# Patient Record
Sex: MALE | Race: WHITE | NOT HISPANIC OR LATINO | Employment: FULL TIME | URBAN - METROPOLITAN AREA
[De-identification: names, ages, dates, MRNs, and addresses within clinical notes are randomized per-mention and may not be internally consistent; named-entity substitution may affect disease eponyms.]

---

## 2017-08-04 DIAGNOSIS — C61 MALIGNANT NEOPLASM OF PROSTATE (HCC): ICD-10-CM

## 2017-08-07 ENCOUNTER — ALLSCRIPTS OFFICE VISIT (OUTPATIENT)
Dept: OTHER | Facility: OTHER | Age: 62
End: 2017-08-07

## 2017-08-07 LAB
BILIRUB UR QL STRIP: NORMAL
CLARITY UR: NORMAL
COLOR UR: YELLOW
GLUCOSE (HISTORICAL): NORMAL
HGB UR QL STRIP.AUTO: NORMAL
KETONES UR STRIP-MCNC: NORMAL MG/DL
LEUKOCYTE ESTERASE UR QL STRIP: NORMAL
NITRITE UR QL STRIP: NORMAL
PH UR STRIP.AUTO: 7 [PH]
PROT UR STRIP-MCNC: NORMAL MG/DL
SP GR UR STRIP.AUTO: 1.01
UROBILINOGEN UR QL STRIP.AUTO: 0.2

## 2018-01-14 VITALS
DIASTOLIC BLOOD PRESSURE: 72 MMHG | SYSTOLIC BLOOD PRESSURE: 134 MMHG | BODY MASS INDEX: 26.84 KG/M2 | WEIGHT: 167 LBS | HEIGHT: 66 IN

## 2018-08-07 DIAGNOSIS — C61 MALIGNANT NEOPLASM OF PROSTATE (HCC): ICD-10-CM

## 2018-08-17 RX ORDER — VARDENAFIL HYDROCHLORIDE 20 MG/1
TABLET ORAL
COMMUNITY
End: 2018-08-24 | Stop reason: SDUPTHER

## 2018-08-17 RX ORDER — SIMVASTATIN 40 MG
TABLET ORAL
COMMUNITY
Start: 2018-04-09

## 2018-08-17 RX ORDER — CHLORAL HYDRATE 500 MG
CAPSULE ORAL
COMMUNITY

## 2018-08-17 RX ORDER — ERGOCALCIFEROL (VITAMIN D2) 10 MCG
TABLET ORAL
COMMUNITY

## 2018-08-24 ENCOUNTER — TELEPHONE (OUTPATIENT)
Dept: UROLOGY | Facility: AMBULATORY SURGERY CENTER | Age: 63
End: 2018-08-24

## 2018-08-24 ENCOUNTER — OFFICE VISIT (OUTPATIENT)
Dept: UROLOGY | Facility: MEDICAL CENTER | Age: 63
End: 2018-08-24
Payer: COMMERCIAL

## 2018-08-24 VITALS
BODY MASS INDEX: 26.84 KG/M2 | WEIGHT: 167 LBS | DIASTOLIC BLOOD PRESSURE: 64 MMHG | HEIGHT: 66 IN | SYSTOLIC BLOOD PRESSURE: 140 MMHG

## 2018-08-24 DIAGNOSIS — N52.31 ERECTILE DYSFUNCTION AFTER RADICAL PROSTATECTOMY: ICD-10-CM

## 2018-08-24 DIAGNOSIS — C61 MALIGNANT NEOPLASM OF PROSTATE (HCC): Primary | ICD-10-CM

## 2018-08-24 PROBLEM — M17.0 PRIMARY OSTEOARTHRITIS OF BOTH KNEES: Status: ACTIVE | Noted: 2018-05-09

## 2018-08-24 LAB
SL AMB  POCT GLUCOSE, UA: NORMAL
SL AMB LEUKOCYTE ESTERASE,UA: NORMAL
SL AMB POCT BILIRUBIN,UA: NORMAL
SL AMB POCT BLOOD,UA: NORMAL
SL AMB POCT CLARITY,UA: CLEAR
SL AMB POCT COLOR,UA: YELLOW
SL AMB POCT KETONES,UA: NORMAL
SL AMB POCT NITRITE,UA: NORMAL
SL AMB POCT PH,UA: 6
SL AMB POCT SPECIFIC GRAVITY,UA: 1.01
SL AMB POCT URINE PROTEIN: NORMAL
SL AMB POCT UROBILINOGEN: 0.2

## 2018-08-24 PROCEDURE — 99214 OFFICE O/P EST MOD 30 MIN: CPT | Performed by: UROLOGY

## 2018-08-24 PROCEDURE — 81003 URINALYSIS AUTO W/O SCOPE: CPT | Performed by: UROLOGY

## 2018-08-24 RX ORDER — VARDENAFIL HYDROCHLORIDE 20 MG/1
20 TABLET ORAL DAILY PRN
Qty: 10 TABLET | Refills: 3 | Status: SHIPPED | OUTPATIENT
Start: 2018-08-24

## 2018-08-24 NOTE — ASSESSMENT & PLAN NOTE
He has not yet tried Levitra  His prescription was refilled  We discussed other options: SADI, injection therapy and penile prosthesis

## 2018-08-24 NOTE — PATIENT INSTRUCTIONS
Prostate Cancer, Ambulatory Care   GENERAL INFORMATION:   Prostate cancer, Ambulatory Care develops in the male sex gland that helps make semen (prostate)  It is about the size of a walnut and wraps around the urethra  The urethra is the tube that carries urine from the bladder to the end of the penis  In most cases, prostate cancer is slow growing  Common symptoms include the following:   · Trouble starting or stopping the flow of urine    · Feeling the need to urinate often, especially at night    · Pain or a burning feeling when you urinate or ejaculate semen    · Trouble having an erection    · Blood in your urine or semen    · Not being able to urinate at all    · Pain or stiffness in your lower back, hips, or upper thighs  Seek immediate care for the following symptoms:   · Chest pain when you take a deep breath or cough    · Coughing up blood    · Suddenly feeling lightheaded and short of breath    · Your leg feels warm, tender, and painful  It may look swollen and red  Treatment for prostate cancer: If you have early stage cancer, your healthcare provider may recommend that you have frequent tests and regular follow-up visits to watch for changes  You may also need any of the following:  · Hormone therapy  is medicine used to decrease testosterone (male hormone) levels  · Radiation therapy  is used to kill cancer cells with high-energy x-rays beams  You may receive radiation therapy from outside your body or from small beads or rods placed inside your prostate  · Surgery  may be needed, depending on the stage of the cancer  Part or all of your prostate may be removed  You may also need to have some lymph nodes taken out  This may help keep the cancer from spreading to other parts of your body  Manage your prostate cancer:   · Eat a variety of healthy foods  Healthy foods include fruits, vegetables, whole-grain breads, low-fat dairy products, beans, lean meats, and fish   Your healthcare provider may also recommend changes to the amounts of calcium and vitamin D you have each day  · Manage your weight  Obesity may increase your risk for problems from prostate cancer  Limit or do not have high-calorie foods or drinks  · Exercise as directed  Exercise may help you recover after treatment and may help prevent your prostate cancer from returning  Exercise can also help you manage your weight  Try to get at least 30 minutes of exercise 5 days a week, such as walking  · Do not smoke  If you smoke, it is never too late to quit  Smoking increases the risk that your prostate cancer will return after treatment  Smoking also increases your risk for other types of cancer  Ask your healthcare provider for information if you need help quitting  · Limit or do not drink alcohol  If you drink, limit alcohol to 2 drinks per day  A drink is 12 ounces of beer, 1½ ounces of liquor, or 5 ounces of wine  Follow up with your urologist or oncologist as directed:  Write down your questions so you remember to ask them during your visits  CARE AGREEMENT:   You have the right to help plan your care  Learn about your health condition and how it may be treated  Discuss treatment options with your caregivers to decide what care you want to receive  You always have the right to refuse treatment  The above information is an  only  It is not intended as medical advice for individual conditions or treatments  Talk to your doctor, nurse or pharmacist before following any medical regimen to see if it is safe and effective for you  © 2014 [address] is for End User's use only and may not be sold, redistributed or otherwise used for commercial purposes  All illustrations and images included in CareNotes® are the copyrighted property of A D A M , Inc  or Joaquín Nicolas  Vardenafil (By mouth)   Vardenafil ([id number])  Treats erectile dysfunction     Brand Name(s): Shun Nevarez   There may be other brand names for this medicine  When This Medicine Should Not Be Used: This medicine is not right for everyone  Do not use it if you had an allergic reaction to vardenafil  How to Use This Medicine:   Tablet, Dissolving Tablet  Take your medicine as directed  Your dose may need to be changed several times to find what works best for you  Take this medicine about 60 minutes before you have sex  Do not take it more than once per day  Always allow at least 24 hours between doses  Disintegrating tablet: Do not open the blister pack until you are ready to take the tablet  Make sure your hands are dry and peel back the foil to remove the tablet  Do not push the tablet through the foil  Place the tablet on your tongue and let it melt  Do not take the medicine with liquid  Do not break, crush, or chew it  Read and follow the patient instructions that come with this medicine  Talk to your doctor or pharmacist if you have any questions  Store the medicine in a closed container at room temperature, away from heat, moisture, and direct light  Drugs and Foods to Avoid:   Ask your doctor or pharmacist before using any other medicine, including over-the-counter medicines, vitamins, and herbal products  Do not use this medicine if you also use riociguat or a nitrate medicine  Some foods and medicines can affect how vardenafil works  Tell your doctor if you are using any of the following:  Atazanavir, clarithromycin, erythromycin, indinavir, itraconazole, ketoconazole, ritonavir, saquinavir  Medicine for heart rhythm problems (including amiodarone, procainamide, quinidine, sotalol)  Medicine to treat high blood pressure or prostate problems (including alfuzosin, doxazosin, prazosin, silodosin, tamsulosin, terazosin)  Do not eat grapefruit or drink grapefruit juice while you are using this medicine    Warnings While Using This Medicine:   Tell your doctor if you have kidney disease, liver disease, heart or blood vessel disease, angina, high or low blood pressure, heart failure, vision or eye problems, heart rhythm problems (including QT prolongation), or a history of stroke or heart attack  Tell your doctor if you have sickle cell anemia, leukemia, multiple myeloma, phenylketonuria (PKU), or fructose intolerance  This medicine may cause the following problems:  Low blood pressure (especially if taken with other medicines that lower blood pressure)  Painful or prolonged erection  Vision or hearing problems  Heart problems  This medicine will not protect you from sexually transmitted diseases (including HIV or AIDS)  Keep all medicine out of the reach of children  Never share your medicine with anyone  Possible Side Effects While Using This Medicine:   Call your doctor right away if you notice any of these side effects: Allergic reaction: Itching or hives, swelling in your face or hands, swelling or tingling in your mouth or throat, chest tightness, trouble breathing  Chest pain that may spread to your jaw or arm, trouble breathing, nausea, unusual sweating  Fast, pounding, or uneven heartbeat  Lightheadedness, fainting  Painful erection or an erection that lasts longer than 4 hours  Sudden vision loss, changes in vision or in how you see colors (especially blue or green)  Sudden hearing loss, ringing in your ears, dizziness  If you notice these less serious side effects, talk with your doctor:   Headache  Stuffy or runny nose  Warmth or redness in your face, neck, arms, or upper chest  If you notice other side effects that you think are caused by this medicine, tell your doctor  Call your doctor for medical advice about side effects  You may report side effects to FDA at 9-716-FDA-6059  © 2017 [address] Information is for End User's use only and may not be sold, redistributed or otherwise used for commercial purposes    The above information is an  only  It is not intended as medical advice for individual conditions or treatments  Talk to your doctor, nurse or pharmacist before following any medical regimen to see if it is safe and effective for you

## 2018-08-24 NOTE — TELEPHONE ENCOUNTER
Spoke with wife, patient wants to know if he can come to the appointment without the bloodwork?  Please advise and call back

## 2018-08-24 NOTE — LETTER
August 24, 2018     [place]  [id number] [facility] 5    Patient: Jayro De Leon   YOB: 1955   Date of Visit: 8/24/2018       Dear Dr Arely Rush: Thank you for referring Jayro De Leon to me for evaluation  Below are my notes for this consultation  If you have questions, please do not hesitate to call me  I look forward to following your patient along with you  Sincerely,        Jamal Giron MD        CC: No Recipients  Jamal Giron MD  8/24/2018 10:39 AM  Sign at close encounter  Assessment/Plan:    Erectile dysfunction after radical prostatectomy  He has not yet tried Levitra  His prescription was refilled  We discussed other options: SADI, injection therapy and penile prosthesis  Malignant neoplasm of prostate [facility])  He is doing well 5 years post RRP  Mild 1 pad/ day stress incontinence  He is satisfied with his voiding pattern  He will check PSA next week  Diagnoses and all orders for this visit:    Malignant neoplasm of prostate ([facility] [address] )  -     POCT urine dip auto non-scope  -     PSA Total, Diagnostic; Future  -     PSA Total, Diagnostic; Future    Erectile dysfunction after radical prostatectomy  -     vardenafil (LEVITRA) 20 MG tablet; Take 1 tablet (20 mg total) by mouth daily as needed for erectile dysfunction          Subjective:      Patient ID: Jayro De Leon is a 60 y o  male  Chief complaint:  prostate cancer and erectile dysfunction    26-year-old male followed for the above complaints  He is now 5 years post radical prostatectomy  His PSAs have been low  He reports he is voiding adequately  His stream is good and he empties well  He has 1 pad per day stress incontinence  He does drink a lot of coffee and notes he voids frequently  There is no gross hematuria, dysuria or symptoms of infection  He has no back or bone pain  There is no weight loss or constitutional symptoms    He never try the Levitra prescribed at his last visit for erectile dysfunction  The following portions of the patient's history were reviewed and updated as appropriate: allergies, current medications, past family history, past medical history, past social history, past surgical history and problem list     Review of Systems   Constitutional: Negative for chills, diaphoresis, fatigue and fever  HENT: Negative  Eyes: Negative  Respiratory: Negative  Cardiovascular: Negative  Endocrine: Negative  Musculoskeletal: Negative  Bad knees   Skin: Negative  Allergic/Immunologic: Negative  Neurological: Negative  Hematological: Negative  Psychiatric/Behavioral: Negative  Objective:      /64 (BP Location: Left arm, Patient Position: Sitting)   Ht 5' 6" (1 676 m)   Wt 75 8 kg (167 lb)   BMI 26 95 kg/m²           Physical Exam   Constitutional: He is oriented to person, place, and time  He appears well-developed and well-nourished  HENT:   Head: Normocephalic and atraumatic  Eyes: Conjunctivae are normal    Neck: Neck supple  Cardiovascular: Normal rate  Pulmonary/Chest: Effort normal    Abdominal: Soft  Bowel sounds are normal  He exhibits no distension and no mass  There is no tenderness  There is no rebound, no guarding and no CVA tenderness  Genitourinary: Rectum normal, testes normal and penis normal  Right testis shows no mass  Left testis shows no mass  No phimosis or hypospadias  Genitourinary Comments: Empty prostatic fossa   Musculoskeletal: He exhibits no edema  Neurological: He is alert and oriented to person, place, and time  Skin: Skin is warm and dry  Psychiatric: He has a normal mood and affect  His behavior is normal  Judgment and thought content normal    Vitals reviewed

## 2018-08-24 NOTE — PROGRESS NOTES
Assessment/Plan:    Erectile dysfunction after radical prostatectomy  He has not yet tried Levitra  His prescription was refilled  We discussed other options: SADI, injection therapy and penile prosthesis  Malignant neoplasm of prostate [facility])  He is doing well 5 years post RRP  Mild 1 pad/ day stress incontinence  He is satisfied with his voiding pattern  He will check PSA next week  Diagnoses and all orders for this visit:    Malignant neoplasm of prostate ([facility] [address] )  -     POCT urine dip auto non-scope  -     PSA Total, Diagnostic; Future  -     PSA Total, Diagnostic; Future    Erectile dysfunction after radical prostatectomy  -     vardenafil (LEVITRA) 20 MG tablet; Take 1 tablet (20 mg total) by mouth daily as needed for erectile dysfunction          Subjective:      Patient ID: Brandy Soto is a 60 y o  male  Chief complaint:  prostate cancer and erectile dysfunction    48-year-old male followed for the above complaints  He is now 5 years post radical prostatectomy  His PSAs have been low  He reports he is voiding adequately  His stream is good and he empties well  He has 1 pad per day stress incontinence  He does drink a lot of coffee and notes he voids frequently  There is no gross hematuria, dysuria or symptoms of infection  He has no back or bone pain  There is no weight loss or constitutional symptoms  He never try the Levitra prescribed at his last visit for erectile dysfunction  The following portions of the patient's history were reviewed and updated as appropriate: allergies, current medications, past family history, past medical history, past social history, past surgical history and problem list     Review of Systems   Constitutional: Negative for chills, diaphoresis, fatigue and fever  HENT: Negative  Eyes: Negative  Respiratory: Negative  Cardiovascular: Negative  Endocrine: Negative  Musculoskeletal: Negative  Bad knees   Skin: Negative  Allergic/Immunologic: Negative  Neurological: Negative  Hematological: Negative  Psychiatric/Behavioral: Negative  Objective:      /64 (BP Location: Left arm, Patient Position: Sitting)   Ht 5' 6" (1 676 m)   Wt 75 8 kg (167 lb)   BMI 26 95 kg/m²          Physical Exam   Constitutional: He is oriented to person, place, and time  He appears well-developed and well-nourished  HENT:   Head: Normocephalic and atraumatic  Eyes: Conjunctivae are normal    Neck: Neck supple  Cardiovascular: Normal rate  Pulmonary/Chest: Effort normal    Abdominal: Soft  Bowel sounds are normal  He exhibits no distension and no mass  There is no tenderness  There is no rebound, no guarding and no CVA tenderness  Genitourinary: Rectum normal, testes normal and penis normal  Right testis shows no mass  Left testis shows no mass  No phimosis or hypospadias  Genitourinary Comments: Empty prostatic fossa   Musculoskeletal: He exhibits no edema  Neurological: He is alert and oriented to person, place, and time  Skin: Skin is warm and dry  Psychiatric: He has a normal mood and affect  His behavior is normal  Judgment and thought content normal    Vitals reviewed

## 2018-08-24 NOTE — ASSESSMENT & PLAN NOTE
He is doing well 5 years post RRP  Mild 1 pad/ day stress incontinence  He is satisfied with his voiding pattern  He will check PSA next week

## 2018-08-24 NOTE — TELEPHONE ENCOUNTER
Patient plans to still come in for appointment today without his bloodwork  He will have it done at another time

## 2018-08-30 ENCOUNTER — TELEPHONE (OUTPATIENT)
Dept: UROLOGY | Facility: MEDICAL CENTER | Age: 63
End: 2018-08-30

## 2018-10-27 NOTE — PROGRESS NOTES
Inform pt that the  test was normal  Keep usual follow up appt 
H&P by attending  Lukasz Dejesus MD, Facep

## 2019-10-18 ENCOUNTER — OFFICE VISIT (OUTPATIENT)
Dept: UROLOGY | Facility: MEDICAL CENTER | Age: 64
End: 2019-10-18
Payer: COMMERCIAL

## 2019-10-18 VITALS
DIASTOLIC BLOOD PRESSURE: 70 MMHG | WEIGHT: 165 LBS | HEART RATE: 79 BPM | SYSTOLIC BLOOD PRESSURE: 130 MMHG | BODY MASS INDEX: 26.52 KG/M2 | HEIGHT: 66 IN

## 2019-10-18 DIAGNOSIS — N52.31 ERECTILE DYSFUNCTION AFTER RADICAL PROSTATECTOMY: ICD-10-CM

## 2019-10-18 DIAGNOSIS — R10.31 RIGHT GROIN PAIN: ICD-10-CM

## 2019-10-18 DIAGNOSIS — C61 MALIGNANT NEOPLASM OF PROSTATE (HCC): Primary | ICD-10-CM

## 2019-10-18 LAB
SL AMB  POCT GLUCOSE, UA: NORMAL
SL AMB LEUKOCYTE ESTERASE,UA: NORMAL
SL AMB POCT BILIRUBIN,UA: NORMAL
SL AMB POCT BLOOD,UA: NORMAL
SL AMB POCT CLARITY,UA: CLEAR
SL AMB POCT COLOR,UA: YELLOW
SL AMB POCT KETONES,UA: NORMAL
SL AMB POCT NITRITE,UA: NORMAL
SL AMB POCT PH,UA: 7
SL AMB POCT SPECIFIC GRAVITY,UA: 1.01
SL AMB POCT URINE PROTEIN: NORMAL
SL AMB POCT UROBILINOGEN: 0.2

## 2019-10-18 PROCEDURE — 81003 URINALYSIS AUTO W/O SCOPE: CPT | Performed by: UROLOGY

## 2019-10-18 PROCEDURE — 99214 OFFICE O/P EST MOD 30 MIN: CPT | Performed by: UROLOGY

## 2019-10-18 NOTE — ASSESSMENT & PLAN NOTE
Viagra has not been effective  We discussed options such as injection therapy and penile implant  He will consider these options  He will call for an appointment in the future to discuss penile implantation if he desires

## 2019-10-18 NOTE — LETTER
October 18, 2019     [id number] [facility] 5    Patient: Yazmin Vance   YOB: 1955   Date of Visit: 10/18/2019       Dear Dr Rosie Wooten: Thank you for referring Yazmin Vance to me for evaluation  Below are my notes for this consultation  If you have questions, please do not hesitate to call me  I look forward to following your patient along with you  Sincerely,        Alejandra Null MD        CC: No Recipients  Alejandra Null MD  10/18/2019  2:41 PM  Sign at close encounter  Assessment/Plan:    Malignant neoplasm of prostate [facility])  He is doing well 6 years post radical prostatectomy  AUA symptom score is 8  He is pleased with his voiding pattern  PSA  last year was undetectable at less than 0 01  We will plan to recheck a PSA level at this time  We will also plan to obtain another PSA level in 1 year  He will return in 1 year for follow-up  Erectile dysfunction after radical prostatectomy  Viagra has not been effective  We discussed options such as injection therapy and penile implant  He will consider these options  He will call for an appointment in the future to discuss penile implantation if he desires  Right groin pain  Intermittent right groin pain  I do not appreciate a hernia  We will obtain an ultrasound of the groin to evaluate further  Consider CT scan if symptoms persist        Diagnoses and all orders for this visit:    Malignant neoplasm of prostate ([address] )  -     POCT urine dip auto non-scope  -     Cancel: PSA Total, Diagnostic; Future  -     PSA Total, Diagnostic; Future    Erectile dysfunction after radical prostatectomy    Right groin pain  -     US scrotum and groin area; Future          Subjective:      Patient ID: Yazmin Vance is a 58 y o  male  Chief complaint:  Prostate cancer    HPI:  19-year-old male followed for the above complaints  He is now 6 years post radical prostatectomy    He reports he is doing well  He voids with an adequate stream and feels he empties his bladder well  He has minimal stress incontinence  There is no gross hematuria, dysuria or symptoms of infection  He has chronic arthritis but no new back or bone pain  Last year he underwent right knee replacement  The left knee  is scheduled for placement in February 2020  Oral agents have not been effective for erectile dysfunction  Overall he is please with his voiding pattern  There is no weight loss or constitutional symptoms  The following portions of the patient's history were reviewed and updated as appropriate: allergies, current medications, past family history, past medical history, past social history, past surgical history and problem list     Review of Systems   Constitutional: Negative for chills, diaphoresis, fatigue and fever  HENT: Negative  Eyes: Negative  Respiratory: Negative  Cardiovascular: Negative  Gastrointestinal: Positive for abdominal pain  Endocrine: Negative  Genitourinary:        See HPI   Musculoskeletal: Positive for arthralgias and back pain  Skin: Negative  Allergic/Immunologic: Negative  Neurological: Negative  Hematological: Negative  Psychiatric/Behavioral: Negative  AUA SYMPTOM SCORE      Most Recent Value   AUA SYMPTOM SCORE   How often have you had a sensation of not emptying your bladder completely after you finished urinating? 2   How often have you had to urinate again less than two hours after you finished urinating? 3   How often have you found you stopped and started again several times when you urinate? 1   How often have you found it difficult to postpone urination? 0   How often have you had a weak urinary stream?  0   How often have you had to push or strain to begin urination? 0   How many times did you most typically get up to urinate from the time you went to bed at night until the time you got up in the morning?   2 Quality of Life: If you were to spend the rest of your life with your urinary condition just the way it is now, how would you feel about that?  1   AUA SYMPTOM SCORE  8        Objective:      /70 (BP Location: Left arm, Patient Position: Sitting, Cuff Size: Adult)   Pulse 79   Ht 5' 6" (1 676 m)   Wt 74 8 kg (165 lb)   BMI 26 63 kg/m²           Physical Exam   Constitutional: He is oriented to person, place, and time  He appears well-developed and well-nourished  HENT:   Head: Normocephalic and atraumatic  Eyes: Conjunctivae are normal    Neck: Neck supple  Cardiovascular: Normal rate  Pulmonary/Chest: Effort normal    Abdominal: Soft  Bowel sounds are normal  He exhibits no distension and no mass  There is no tenderness  There is no rebound, no guarding and no CVA tenderness  No hernia  Genitourinary: Rectum normal, testes normal and penis normal  Right testis shows no mass  Left testis shows no mass  No phimosis or hypospadias  Musculoskeletal: He exhibits no edema  Neurological: He is alert and oriented to person, place, and time  Skin: Skin is warm and dry  Psychiatric: He has a normal mood and affect  His behavior is normal  Judgment and thought content normal    Vitals reviewed

## 2019-10-18 NOTE — PATIENT INSTRUCTIONS
Prostate Cancer, Ambulatory Care   GENERAL INFORMATION:   Prostate cancer, Ambulatory Care develops in the male sex gland that helps make semen (prostate)  It is about the size of a walnut and wraps around the urethra  The urethra is the tube that carries urine from the bladder to the end of the penis  In most cases, prostate cancer is slow growing  Common symptoms include the following:   · Trouble starting or stopping the flow of urine    · Feeling the need to urinate often, especially at night    · Pain or a burning feeling when you urinate or ejaculate semen    · Trouble having an erection    · Blood in your urine or semen    · Not being able to urinate at all    · Pain or stiffness in your lower back, hips, or upper thighs  Seek immediate care for the following symptoms:   · Chest pain when you take a deep breath or cough    · Coughing up blood    · Suddenly feeling lightheaded and short of breath    · Your leg feels warm, tender, and painful  It may look swollen and red  Treatment for prostate cancer: If you have early stage cancer, your healthcare provider may recommend that you have frequent tests and regular follow-up visits to watch for changes  You may also need any of the following:  · Hormone therapy  is medicine used to decrease testosterone (male hormone) levels  · Radiation therapy  is used to kill cancer cells with high-energy x-rays beams  You may receive radiation therapy from outside your body or from small beads or rods placed inside your prostate  · Surgery  may be needed, depending on the stage of the cancer  Part or all of your prostate may be removed  You may also need to have some lymph nodes taken out  This may help keep the cancer from spreading to other parts of your body  Manage your prostate cancer:   · Eat a variety of healthy foods  Healthy foods include fruits, vegetables, whole-grain breads, low-fat dairy products, beans, lean meats, and fish   Your healthcare provider may also recommend changes to the amounts of calcium and vitamin D you have each day  · Manage your weight  Obesity may increase your risk for problems from prostate cancer  Limit or do not have high-calorie foods or drinks  · Exercise as directed  Exercise may help you recover after treatment and may help prevent your prostate cancer from returning  Exercise can also help you manage your weight  Try to get at least 30 minutes of exercise 5 days a week, such as walking  · Do not smoke  If you smoke, it is never too late to quit  Smoking increases the risk that your prostate cancer will return after treatment  Smoking also increases your risk for other types of cancer  Ask your healthcare provider for information if you need help quitting  · Limit or do not drink alcohol  If you drink, limit alcohol to 2 drinks per day  A drink is 12 ounces of beer, 1½ ounces of liquor, or 5 ounces of wine  Follow up with your urologist or oncologist as directed:  Write down your questions so you remember to ask them during your visits  CARE AGREEMENT:   You have the right to help plan your care  Learn about your health condition and how it may be treated  Discuss treatment options with your caregivers to decide what care you want to receive  You always have the right to refuse treatment  The above information is an  only  It is not intended as medical advice for individual conditions or treatments  Talk to your doctor, nurse or pharmacist before following any medical regimen to see if it is safe and effective for you  © 2014 [address] is for End User's use only and may not be sold, redistributed or otherwise used for commercial purposes  All illustrations and images included in CareNotes® are the copyrighted property of [employer]  or Joaquín Nicolas

## 2019-10-18 NOTE — PROGRESS NOTES
Assessment/Plan:    Malignant neoplasm of prostate [facility])  He is doing well 6 years post radical prostatectomy  AUA symptom score is 8  He is pleased with his voiding pattern  PSA  last year was undetectable at less than 0 01  We will plan to recheck a PSA level at this time  We will also plan to obtain another PSA level in 1 year  He will return in 1 year for follow-up  Erectile dysfunction after radical prostatectomy  Viagra has not been effective  We discussed options such as injection therapy and penile implant  He will consider these options  He will call for an appointment in the future to discuss penile implantation if he desires  Right groin pain  Intermittent right groin pain  I do not appreciate a hernia  We will obtain an ultrasound of the groin to evaluate further  Consider CT scan if symptoms persist        Diagnoses and all orders for this visit:    Malignant neoplasm of prostate ([facility] [address] )  -     POCT urine dip auto non-scope  -     Cancel: PSA Total, Diagnostic; Future  -     PSA Total, Diagnostic; Future    Erectile dysfunction after radical prostatectomy    Right groin pain  -     US scrotum and groin area; Future          Subjective:      Patient ID: Ruth Stern is a 58 y o  male  Chief complaint:  Prostate cancer    HPI:  48-year-old male followed for the above complaints  He is now 6 years post radical prostatectomy  He reports he is doing well  He voids with an adequate stream and feels he empties his bladder well  He has minimal stress incontinence  There is no gross hematuria, dysuria or symptoms of infection  He has chronic arthritis but no new back or bone pain  Last year he underwent right knee replacement  The left knee  is scheduled for placement in February 2020  Oral agents have not been effective for erectile dysfunction  Overall he is please with his voiding pattern  There is no weight loss or constitutional symptoms        The following portions of the patient's history were reviewed and updated as appropriate: allergies, current medications, past family history, past medical history, past social history, past surgical history and problem list     Review of Systems   Constitutional: Negative for chills, diaphoresis, fatigue and fever  HENT: Negative  Eyes: Negative  Respiratory: Negative  Cardiovascular: Negative  Gastrointestinal: Positive for abdominal pain  Endocrine: Negative  Genitourinary:        See HPI   Musculoskeletal: Positive for arthralgias and back pain  Skin: Negative  Allergic/Immunologic: Negative  Neurological: Negative  Hematological: Negative  Psychiatric/Behavioral: Negative  AUA SYMPTOM SCORE      Most Recent Value   AUA SYMPTOM SCORE   How often have you had a sensation of not emptying your bladder completely after you finished urinating? 2   How often have you had to urinate again less than two hours after you finished urinating? 3   How often have you found you stopped and started again several times when you urinate? 1   How often have you found it difficult to postpone urination? 0   How often have you had a weak urinary stream?  0   How often have you had to push or strain to begin urination? 0   How many times did you most typically get up to urinate from the time you went to bed at night until the time you got up in the morning? 2   Quality of Life: If you were to spend the rest of your life with your urinary condition just the way it is now, how would you feel about that?  1   AUA SYMPTOM SCORE  8        Objective:      /70 (BP Location: Left arm, Patient Position: Sitting, Cuff Size: Adult)   Pulse 79   Ht 5' 6" (1 676 m)   Wt 74 8 kg (165 lb)   BMI 26 63 kg/m²          Physical Exam   Constitutional: He is oriented to person, place, and time  He appears well-developed and well-nourished  HENT:   Head: Normocephalic and atraumatic     Eyes: Conjunctivae are normal    Neck: Neck supple  Cardiovascular: Normal rate  Pulmonary/Chest: Effort normal    Abdominal: Soft  Bowel sounds are normal  He exhibits no distension and no mass  There is no tenderness  There is no rebound, no guarding and no CVA tenderness  No hernia  Genitourinary: Rectum normal, testes normal and penis normal  Right testis shows no mass  Left testis shows no mass  No phimosis or hypospadias  Musculoskeletal: He exhibits no edema  Neurological: He is alert and oriented to person, place, and time  Skin: Skin is warm and dry  Psychiatric: He has a normal mood and affect  His behavior is normal  Judgment and thought content normal    Vitals reviewed

## 2019-10-18 NOTE — ASSESSMENT & PLAN NOTE
Intermittent right groin pain  I do not appreciate a hernia  We will obtain an ultrasound of the groin to evaluate further    Consider CT scan if symptoms persist

## 2019-10-18 NOTE — ASSESSMENT & PLAN NOTE
He is doing well 6 years post radical prostatectomy  AUA symptom score is 8  He is pleased with his voiding pattern  PSA  last year was undetectable at less than 0 01  We will plan to recheck a PSA level at this time  We will also plan to obtain another PSA level in 1 year  He will return in 1 year for follow-up

## 2019-10-19 ENCOUNTER — HOSPITAL ENCOUNTER (OUTPATIENT)
Dept: ULTRASOUND IMAGING | Facility: HOSPITAL | Age: 64
Discharge: HOME | End: 2019-10-19
Attending: UROLOGY
Payer: COMMERCIAL

## 2019-10-19 DIAGNOSIS — R10.31 RIGHT GROIN PAIN: ICD-10-CM

## 2019-10-19 PROCEDURE — 76705 ECHO EXAM OF ABDOMEN: CPT

## 2019-10-21 ENCOUNTER — TELEPHONE (OUTPATIENT)
Dept: UROLOGY | Facility: CLINIC | Age: 64
End: 2019-10-21

## 2019-10-21 NOTE — TELEPHONE ENCOUNTER
Called and left a message with US results and to give a call to the office with any questions     ----- Message from Dev Emmanuel MD sent at 10/20/2019 11:46 AM EDT -----  Inform pt that the  test was normal  Keep usual follow up appt

## 2020-10-08 ENCOUNTER — TELEPHONE (OUTPATIENT)
Dept: UROLOGY | Facility: MEDICAL CENTER | Age: 65
End: 2020-10-08

## 2020-10-20 ENCOUNTER — OFFICE VISIT (OUTPATIENT)
Dept: UROLOGY | Facility: MEDICAL CENTER | Age: 65
End: 2020-10-20
Payer: MEDICARE

## 2020-10-20 VITALS
HEIGHT: 66 IN | WEIGHT: 173 LBS | TEMPERATURE: 98.2 F | SYSTOLIC BLOOD PRESSURE: 140 MMHG | BODY MASS INDEX: 27.8 KG/M2 | DIASTOLIC BLOOD PRESSURE: 80 MMHG

## 2020-10-20 DIAGNOSIS — N52.31 ERECTILE DYSFUNCTION AFTER RADICAL PROSTATECTOMY: ICD-10-CM

## 2020-10-20 DIAGNOSIS — C61 MALIGNANT NEOPLASM OF PROSTATE (HCC): Primary | ICD-10-CM

## 2020-10-20 PROCEDURE — 99214 OFFICE O/P EST MOD 30 MIN: CPT | Performed by: UROLOGY

## 2021-10-26 DIAGNOSIS — C61 MALIGNANT NEOPLASM OF PROSTATE (HCC): Primary | ICD-10-CM

## 2021-10-28 ENCOUNTER — OFFICE VISIT (OUTPATIENT)
Dept: UROLOGY | Facility: MEDICAL CENTER | Age: 66
End: 2021-10-28
Payer: MEDICARE

## 2021-10-28 VITALS
DIASTOLIC BLOOD PRESSURE: 60 MMHG | SYSTOLIC BLOOD PRESSURE: 124 MMHG | HEART RATE: 66 BPM | BODY MASS INDEX: 26.36 KG/M2 | HEIGHT: 66 IN | WEIGHT: 164 LBS

## 2021-10-28 DIAGNOSIS — N52.31 ERECTILE DYSFUNCTION AFTER RADICAL PROSTATECTOMY: ICD-10-CM

## 2021-10-28 DIAGNOSIS — C61 MALIGNANT NEOPLASM OF PROSTATE (HCC): Primary | ICD-10-CM

## 2021-10-28 PROCEDURE — 99213 OFFICE O/P EST LOW 20 MIN: CPT | Performed by: NURSE PRACTITIONER

## 2022-11-04 ENCOUNTER — OFFICE VISIT (OUTPATIENT)
Dept: UROLOGY | Facility: MEDICAL CENTER | Age: 67
End: 2022-11-04

## 2022-11-04 VITALS
SYSTOLIC BLOOD PRESSURE: 130 MMHG | WEIGHT: 165 LBS | OXYGEN SATURATION: 98 % | HEART RATE: 67 BPM | DIASTOLIC BLOOD PRESSURE: 68 MMHG | BODY MASS INDEX: 26.52 KG/M2 | HEIGHT: 66 IN

## 2022-11-04 DIAGNOSIS — C61 MALIGNANT NEOPLASM OF PROSTATE (HCC): Primary | ICD-10-CM

## 2022-11-04 DIAGNOSIS — N52.31 ERECTILE DYSFUNCTION AFTER RADICAL PROSTATECTOMY: ICD-10-CM

## 2022-11-04 DIAGNOSIS — N39.3 STRESS INCONTINENCE OF URINE: ICD-10-CM

## 2022-11-04 NOTE — PROGRESS NOTES
Assessment/Plan:    Malignant neoplasm of prostate [facility])  He is doing well 9 years post radical prostatectomy  PSA last year was undetectable  A repeat PSA level is pending  He will return in 1 year for follow-up  We will plan to repeat his PSA prior to next year's visit  Stress incontinence of urine  He has minimal stress incontinence  Wears 1 pad every 3 days  We will continue to follow  Erectile dysfunction after radical prostatectomy  Oral agents have not been effective  He will consider injection therapy  Diagnoses and all orders for this visit:    Malignant neoplasm of prostate ([facility] [address] )  -     PSA Total, Diagnostic; Future    Stress incontinence of urine  -     Urinalysis with microscopic; Future    Erectile dysfunction after radical prostatectomy          Subjective:      Patient ID: Tamera Wheeler is a 78 y o  male  Chief complaint:  Prostate cancer    HPI:  57-year-old male followed for the above complaints  He is now 9 years post radical prostatectomy  He reports he is doing well  He voids with an adequate stream and feels he empties his bladder well  He has minimal stress incontinence  He uses 1 pad every few days  There is no gross hematuria, dysuria or symptoms of infection  He has chronic arthritis but no new back or bone pain  Oral agents have not been effective for erectile dysfunction  Overall he is please with his voiding pattern  There is no weight loss or constitutional symptoms  No gross hematuria or symptoms of infection  The following portions of the patient's history were reviewed and updated as appropriate: allergies, current medications, past family history, past medical history, past social history, past surgical history and problem list     Review of Systems   Constitutional: Negative  Negative for activity change, appetite change, chills, diaphoresis, fatigue, fever and unexpected weight change  HENT: Negative  Eyes: Negative      Respiratory: Negative  Cardiovascular: Negative  Gastrointestinal: Negative  Negative for abdominal pain  Endocrine: Negative  Genitourinary:        See HPI   Musculoskeletal: Negative  Negative for arthralgias and back pain  Skin: Negative  Allergic/Immunologic: Negative  Neurological: Negative  Hematological: Negative  Psychiatric/Behavioral: Negative  AUA SYMPTOM SCORE    Flowsheet Row Most Recent Value   AUA SYMPTOM SCORE    How often have you had a sensation of not emptying your bladder completely after you finished urinating? 0   How often have you had to urinate again less than two hours after you finished urinating? 3   How often have you found you stopped and started again several times when you urinate? 0   How often have you found it difficult to postpone urination? 0   How often have you had a weak urinary stream? 0   How often have you had to push or strain to begin urination? 0   How many times did you most typically get up to urinate from the time you went to bed at night until the time you got up in the morning? 3   Quality of Life: If you were to spend the rest of your life with your urinary condition just the way it is now, how would you feel about that? 1   AUA SYMPTOM SCORE 6          Objective:      /68   Pulse 67   Ht 5' 6" (1 676 m)   Wt 74 8 kg (165 lb)   SpO2 98%   BMI 26 63 kg/m²          Physical Exam  Vitals reviewed  Constitutional:       General: He is not in acute distress  Appearance: Normal appearance  He is well-developed and normal weight  He is not ill-appearing, toxic-appearing or diaphoretic  HENT:      Head: Normocephalic and atraumatic  Eyes:      General: No scleral icterus  Conjunctiva/sclera: Conjunctivae normal    Cardiovascular:      Rate and Rhythm: Normal rate  Pulmonary:      Effort: Pulmonary effort is normal    Abdominal:      General: Bowel sounds are normal  There is no distension  Palpations: Abdomen is soft  There is no mass  Tenderness: There is no abdominal tenderness  There is no right CVA tenderness, left CVA tenderness, guarding or rebound  Hernia: No hernia is present  Genitourinary:     Penis: Normal  No phimosis or hypospadias  Testes: Normal          Right: Mass not present  Left: Mass not present  Rectum: Normal       Comments: Empty prostatic fossa  Musculoskeletal:      Cervical back: Normal range of motion and neck supple  Skin:     General: Skin is warm and dry  Neurological:      General: No focal deficit present  Mental Status: He is alert and oriented to person, place, and time  Psychiatric:         Mood and Affect: Mood normal          Behavior: Behavior normal          Thought Content:  Thought content normal          Judgment: Judgment normal

## 2022-11-04 NOTE — ASSESSMENT & PLAN NOTE
He is doing well 9 years post radical prostatectomy  PSA last year was undetectable  A repeat PSA level is pending  He will return in 1 year for follow-up  We will plan to repeat his PSA prior to next year's visit

## 2024-03-28 DIAGNOSIS — C61 MALIGNANT NEOPLASM OF PROSTATE (HCC): Primary | ICD-10-CM

## 2024-04-09 ENCOUNTER — OFFICE VISIT (OUTPATIENT)
Dept: UROLOGY | Facility: MEDICAL CENTER | Age: 69
End: 2024-04-09
Payer: MEDICARE

## 2024-04-09 VITALS
WEIGHT: 157 LBS | SYSTOLIC BLOOD PRESSURE: 150 MMHG | BODY MASS INDEX: 24.64 KG/M2 | DIASTOLIC BLOOD PRESSURE: 58 MMHG | OXYGEN SATURATION: 98 % | HEART RATE: 78 BPM | HEIGHT: 67 IN

## 2024-04-09 DIAGNOSIS — C61 MALIGNANT NEOPLASM OF PROSTATE (HCC): Primary | ICD-10-CM

## 2024-04-09 DIAGNOSIS — N39.3 STRESS INCONTINENCE OF URINE: ICD-10-CM

## 2024-04-09 DIAGNOSIS — N52.31 ERECTILE DYSFUNCTION AFTER RADICAL PROSTATECTOMY: ICD-10-CM

## 2024-04-09 PROCEDURE — 99214 OFFICE O/P EST MOD 30 MIN: CPT | Performed by: UROLOGY

## 2024-04-09 NOTE — ASSESSMENT & PLAN NOTE
No significant change in his urinary stress incontinence.  He generally uses 1 pad every few days.  Urinalysis is negative.  Consider pelvic floor rehabilitation if symptoms worsen.  I do not think he would benefit from an artificial sphincter at this time.  We will continue to observe.

## 2024-04-09 NOTE — ASSESSMENT & PLAN NOTE
He is not interested in therapy at this time.  We did discuss injection therapy and placement of penile prosthesis.

## 2024-04-09 NOTE — PROGRESS NOTES
Assessment/Plan:    Malignant neoplasm of prostate ([facility])  He is doing well 11 years post radical prostatectomy.  PSA in October 2023 was less than 0.01.  We will plan to recheck PSA in October 2024.    Stress incontinence of urine  No significant change in his urinary stress incontinence.  He generally uses 1 pad every few days.  Urinalysis is negative.  Consider pelvic floor rehabilitation if symptoms worsen.  I do not think he would benefit from an artificial sphincter at this time.  We will continue to observe.    Erectile dysfunction after radical prostatectomy  He is not interested in therapy at this time.  We did discuss injection therapy and placement of penile prosthesis.       Diagnoses and all orders for this visit:    Malignant neoplasm of prostate ([facility])  -     PSA Total, Diagnostic; Future    Stress incontinence of urine  -     Urinalysis with microscopic; Future    Erectile dysfunction after radical prostatectomy            Subjective:      Patient ID: Luis Miguel Philippe is a 68 y.o. male.    Chief complaint:  Prostate cancer    HPI:  68-year-old male followed for the above complaints.  He is now 11 years post radical prostatectomy for a PT2c Eneida 3+4 equal 7 prostate cancer (April 9, 2013).  He reports he is doing well.  He voids with an adequate stream and feels he empties his bladder well.  He has minimal stress incontinence. He uses 1 pad every few days.  There is no gross hematuria, dysuria or symptoms of infection.  He has chronic arthritis but no new back or bone pain.     Oral agents have not been effective for erectile dysfunction.  Overall he is please with his voiding pattern.  There is no weight loss or constitutional symptoms.  No gross hematuria or symptoms of infection.        The following portions of the patient's history were reviewed and updated as appropriate: allergies, current medications, past family history, past medical history, past social history, past surgical history and  "problem list.    Review of Systems   Constitutional: Negative.  Negative for activity change, appetite change, chills, diaphoresis, fatigue, fever and unexpected weight change.   HENT: Negative.     Eyes: Negative.    Respiratory: Negative.     Cardiovascular: Negative.    Gastrointestinal: Negative.  Negative for abdominal pain and blood in stool.   Endocrine: Negative.    Genitourinary:         See HPI   Musculoskeletal:  Positive for arthralgias. Negative for back pain.   Skin: Negative.    Allergic/Immunologic: Negative.    Neurological: Negative.    Hematological: Negative.    Psychiatric/Behavioral: Negative.         AUA SYMPTOM SCORE      Flowsheet Row Most Recent Value   AUA SYMPTOM SCORE    How often have you had a sensation of not emptying your bladder completely after you finished urinating? 0   How often have you had to urinate again less than two hours after you finished urinating? 3   How often have you found you stopped and started again several times when you urinate? 0   How often have you found it difficult to postpone urination? 0   How often have you had a weak urinary stream? 0   How often have you had to push or strain to begin urination? 0   How many times did you most typically get up to urinate from the time you went to bed at night until the time you got up in the morning? 3   Quality of Life: If you were to spend the rest of your life with your urinary condition just the way it is now, how would you feel about that? 1   AUA SYMPTOM SCORE 6            Objective:      /58 (BP Location: Left arm, Patient Position: Sitting, Cuff Size: Standard)   Pulse 78   Ht 5' 7\" (1.702 m)   Wt 71.2 kg (157 lb)   SpO2 98%   BMI 24.59 kg/m²          Physical Exam  Vitals reviewed.   Constitutional:       General: He is not in acute distress.     Appearance: Normal appearance. He is well-developed and normal weight. He is not ill-appearing, toxic-appearing or diaphoretic.   HENT:      Head: " Normocephalic and atraumatic.   Eyes:      General: No scleral icterus.     Conjunctiva/sclera: Conjunctivae normal.   Cardiovascular:      Rate and Rhythm: Normal rate.   Pulmonary:      Effort: Pulmonary effort is normal.   Abdominal:      General: Bowel sounds are normal. There is no distension.      Palpations: Abdomen is soft. There is no mass.      Tenderness: There is no abdominal tenderness. There is no right CVA tenderness, left CVA tenderness, guarding or rebound.      Hernia: No hernia is present.   Genitourinary:     Penis: Normal. No phimosis or hypospadias.       Testes: Normal.         Right: Mass not present.         Left: Mass not present.      Rectum: Normal.      Comments: Empty prostatic fossa  Musculoskeletal:         General: Normal range of motion.      Cervical back: Normal range of motion and neck supple.   Skin:     General: Skin is warm and dry.   Neurological:      General: No focal deficit present.      Mental Status: He is alert and oriented to person, place, and time.   Psychiatric:         Mood and Affect: Mood normal.         Behavior: Behavior normal.         Thought Content: Thought content normal.         Judgment: Judgment normal.

## 2024-04-09 NOTE — ASSESSMENT & PLAN NOTE
He is doing well 11 years post radical prostatectomy.  PSA in October 2023 was less than 0.01.  We will plan to recheck PSA in October 2024.

## 2024-08-09 ENCOUNTER — OFFICE VISIT (OUTPATIENT)
Dept: URGENT CARE | Facility: CLINIC | Age: 69
End: 2024-08-09
Payer: MEDICARE

## 2024-08-09 VITALS
TEMPERATURE: 97.7 F | SYSTOLIC BLOOD PRESSURE: 159 MMHG | DIASTOLIC BLOOD PRESSURE: 65 MMHG | HEART RATE: 64 BPM | RESPIRATION RATE: 18 BRPM | OXYGEN SATURATION: 97 %

## 2024-08-09 DIAGNOSIS — L23.7 POISON IVY: Primary | ICD-10-CM

## 2024-08-09 PROCEDURE — G0463 HOSPITAL OUTPT CLINIC VISIT: HCPCS | Performed by: PHYSICIAN ASSISTANT

## 2024-08-09 PROCEDURE — 99213 OFFICE O/P EST LOW 20 MIN: CPT | Performed by: PHYSICIAN ASSISTANT

## 2024-08-09 RX ORDER — PREDNISONE 20 MG/1
60 TABLET ORAL DAILY
Qty: 15 TABLET | Refills: 0 | Status: SHIPPED | OUTPATIENT
Start: 2024-08-09 | End: 2024-08-14

## 2024-08-09 RX ORDER — BENZOCAINE/MENTHOL 6 MG-10 MG
LOZENGE MUCOUS MEMBRANE 3 TIMES DAILY
Qty: 56 G | Refills: 0 | Status: SHIPPED | OUTPATIENT
Start: 2024-08-09

## 2024-08-09 NOTE — PATIENT INSTRUCTIONS
Follow-up with your family doctor in 3 to 5 days for reevaluation  Take oral steroids as directed  Apply topical steroids to lesions  Avoid heat

## 2024-08-09 NOTE — PROGRESS NOTES
[facility]        NAME: Luis Miguel Philippe is a 68 y.o. male  : 1955    MRN: [id number]  DATE: 2024  TIME: 9:41 AM    Assessment and Plan   Poison ivy [L23.7]  1. Poison ivy              Patient Instructions       Follow up with PCP in 3-5 days.  Proceed to  ER if symptoms worsen.    If tests have been performed at [facility], our office will contact you with results if changes need to be made to the care plan discussed with you at the visit.  [facility]    Chief Complaint     Chief Complaint   Patient presents with    Poison Ivy     Started Tuesday arm chest          History of Present Illness       Patient is a 68-year-old male presenting to the office complaining of exposure to poison ivy when he was working on his yard.  Patient does have a rash to the right forearm and lower abdomen.  Patient has no wheezes retractions or stridor.  Patient is tolerating p.o. with no vomiting or diarrhea.  Patient presented to the office with a temporal temperature 97.7 and resting heart rate of 64.  Patient had a respiratory rate of 18 with an SpO2 97% on room air.    Poison Ivy  This is a new problem. Episode onset: 2 days ago. The problem is unchanged. Location: Right forearm and abdomen. The rash is characterized by itchiness. Associated with: Poison ivy. Past treatments include nothing.       Review of Systems   Review of Systems   Skin:  Positive for rash.   All other systems reviewed and are negative.        Current Medications       Current Outpatient Medications:     ascorbic acid (VITAMIN C) 1000 MG tablet, Take 1,000 mg by mouth daily, Disp: , Rfl:     Cholecalciferol 25 MCG (1000 UT) CHEW, Chew, Disp: , Rfl:     meloxicam (MOBIC) 7.5 mg tablet, TAKE 1 TABLET BY MOUTH ONCE DAILY AS NEEDED FOR JOINT PAIN, Disp: , Rfl:     Multiple Vitamin (DAILY VALUE MULTIVITAMIN) TABS, Take by mouth, Disp: , Rfl:     Omega-3 Fatty Acids (FISH OIL) 1,000 mg, Take by  mouth, Disp: , Rfl:     simvastatin (ZOCOR) 40 mg tablet, TAKE 1 TABLET EVERY EVENING, Disp: , Rfl:     Sodium Sulfate-Mag Sulfate-KCl (Sutab) 0018-213-087 MG TABS, Take 1 kit by mouth see administration instructions, Disp: 24 tablet, Rfl: 0    Current Allergies     Allergies as of 08/09/2024    (No Known Allergies)            The following portions of the patient's history were reviewed and updated as appropriate: allergies, current medications, past family history, past medical history, past social history, past surgical history and problem list.     Past Medical History:   Diagnosis Date    Elevated PSA     Erectile dysfunction after radical prostatectomy     Herpes zoster 11/1/2012    Hypercholesteremia     Incontinence     Malignant neoplasm of prostate (HCC)        Past Surgical History:   Procedure Laterality Date    CATARACT EXTRACTION Bilateral 1983    COLONOSCOPY  10/2015    STACEY Jacques. Sessile rectal tubular adenoma polyp removed.    COLONOSCOPY  2007    STACEY Jacques. Sigmoid hyperplastic polyp removed, mild diverticulosis.    PROSTATE BIOPSY  2013    PROSTATECTOMY  2013       Family History   Problem Relation Age of Onset    Breast cancer Mother     Heart disease Father          Medications have been verified.        Objective   /65   Pulse 64   Temp 97.7 °F (36.5 °C)   Resp 18   SpO2 97%   No LMP for male patient.       Physical Exam     Physical Exam  Vitals and nursing note reviewed.   Constitutional:       General: He is not in acute distress.     Appearance: Normal appearance. He is not ill-appearing, toxic-appearing or diaphoretic.   HENT:      Head: Normocephalic and atraumatic.      Right Ear: External ear normal.      Left Ear: External ear normal.      Nose: Nose normal.      Mouth/Throat:      Mouth: Mucous membranes are moist.   Eyes:      General: No scleral icterus.        Right eye: No discharge.         Left eye: No discharge.      Extraocular Movements: Extraocular  movements intact.      Conjunctiva/sclera: Conjunctivae normal.   Pulmonary:      Effort: Pulmonary effort is normal. No respiratory distress.   Abdominal:      General: There is no distension.      Palpations: Abdomen is soft. There is no mass.      Tenderness: There is no abdominal tenderness. There is no right CVA tenderness, left CVA tenderness, guarding or rebound.      Hernia: No hernia is present.   Musculoskeletal:         General: No swelling, tenderness, deformity or signs of injury. Normal range of motion.      Cervical back: Normal range of motion and neck supple.   Skin:     Capillary Refill: Capillary refill takes less than 2 seconds.      Coloration: Skin is not jaundiced.      Findings: Erythema and rash present. No bruising or lesion.      Comments: Examination is reveals a macular erythematous blanchable rash to the right forearm and right side of the lower abdomen.  There is no induration or cellulitis.  There is no desquamation of the skin.   Neurological:      General: No focal deficit present.      Mental Status: He is alert and oriented to person, place, and time.      Cranial Nerves: No cranial nerve deficit.      Sensory: No sensory deficit.      Motor: No weakness.      Coordination: Coordination normal.      Gait: Gait normal.

## 2025-04-10 ENCOUNTER — TELEPHONE (OUTPATIENT)
Dept: UROLOGY | Facility: MEDICAL CENTER | Age: 70
End: 2025-04-10

## 2025-04-10 NOTE — TELEPHONE ENCOUNTER
Left message reminding pt that they have an appointment on 4/17 and asking that they have their PSA and Urinalysis done prior.

## 2025-04-17 ENCOUNTER — OFFICE VISIT (OUTPATIENT)
Dept: UROLOGY | Facility: MEDICAL CENTER | Age: 70
End: 2025-04-17
Payer: MEDICARE

## 2025-04-17 VITALS
HEIGHT: 66 IN | SYSTOLIC BLOOD PRESSURE: 132 MMHG | DIASTOLIC BLOOD PRESSURE: 60 MMHG | WEIGHT: 157 LBS | BODY MASS INDEX: 25.23 KG/M2 | HEART RATE: 60 BPM | OXYGEN SATURATION: 98 %

## 2025-04-17 DIAGNOSIS — N39.3 STRESS INCONTINENCE OF URINE: ICD-10-CM

## 2025-04-17 DIAGNOSIS — C61 MALIGNANT NEOPLASM OF PROSTATE (HCC): Primary | ICD-10-CM

## 2025-04-17 LAB
SL AMB  POCT GLUCOSE, UA: NORMAL
SL AMB LEUKOCYTE ESTERASE,UA: NORMAL
SL AMB POCT BILIRUBIN,UA: NORMAL
SL AMB POCT BLOOD,UA: NORMAL
SL AMB POCT CLARITY,UA: CLEAR
SL AMB POCT COLOR,UA: YELLOW
SL AMB POCT KETONES,UA: NORMAL
SL AMB POCT NITRITE,UA: NORMAL
SL AMB POCT PH,UA: 6
SL AMB POCT SPECIFIC GRAVITY,UA: <=1.005
SL AMB POCT URINE PROTEIN: NORMAL
SL AMB POCT UROBILINOGEN: 0.2

## 2025-04-17 PROCEDURE — 81003 URINALYSIS AUTO W/O SCOPE: CPT

## 2025-04-17 PROCEDURE — 99213 OFFICE O/P EST LOW 20 MIN: CPT

## 2025-04-17 NOTE — ASSESSMENT & PLAN NOTE
History of pT2c Eneida 3+4 = 7 prostate cancer status post radical prostatectomy performed in 2013  Patient's last PSA was performed 10/12/2023 and found to be undetectable at a value of less than 0.01.  We discussed that the patient is due for repeat PSA.  New PSA placed in the patient's chart and advised to go to the lab to undergo PSA testing.  Patient will undergo PSA testing in our office will call with PSA results

## 2025-04-17 NOTE — PROGRESS NOTES
4/17/2025      Assessment and Plan    69 y.o. male managed by Dr. Henderson    Malignant neoplasm of prostate (HCC)  History of pT2c Adrian 3+4 = 7 prostate cancer status post radical prostatectomy performed in 2013  Patient's last PSA was performed 10/12/2023 and found to be undetectable at a value of less than 0.01.  We discussed that the patient is due for repeat PSA.  New PSA placed in the patient's chart and advised to go to the lab to undergo PSA testing.  Patient will undergo PSA testing in our office will call with PSA results    Stress incontinence of urine  Continues to utilize 1 pad per week for his minimal stress urinary incontinence  Patient currently content with his stress urinary incontinence.  If his symptoms were to worsen then I would recommend pelvic floor physical therapy.        History of Present Illness  Luis Miguel Philippe is a 69 y.o. male here for evaluation of history of Adrian 3+4 = 7 prostate cancer status post radical prostatectomy performed 12 years ago.  Final pathology revealed pT2c Eneida 3+4 = 7 prostate cancer.  Patient's last PSA was performed 10/12/2023 and found to be less than 0.01.  Patient did develop stress urinary incontinence following his prostatectomy and previously utilized 1 pad every few days.  Patient did develop erectile dysfunction after his radical prostatectomy and injection therapy or placement of a penile prosthesis had been discussed in the past, but the patient was not interested in either these options.  Patient did trial Cialis and Viagra, but was refractory to PDE 5 inhibitors following the prostatectomy.    Today, the patient reports that he did undergo blood work, but forgot to bring the PSA slip with him to the lab so this was not obtained.  Otherwise, the patient offers no new lower urinary tract complaints today's office visit.  Patient reports that he does continue to go through 1 pad per week due to his minimal stress urinary  "incontinence.        Review of Systems   Constitutional:  Negative for chills and fever.   HENT:  Negative for ear pain and sore throat.    Eyes:  Negative for pain and visual disturbance.   Respiratory:  Negative for cough and shortness of breath.    Cardiovascular:  Negative for chest pain and palpitations.   Gastrointestinal:  Negative for abdominal pain and vomiting.   Genitourinary:  Negative for decreased urine volume, difficulty urinating, dysuria, flank pain, frequency, hematuria and urgency.   Musculoskeletal:  Negative for arthralgias and back pain.   Skin:  Negative for color change and rash.   Neurological:  Negative for seizures and syncope.   All other systems reviewed and are negative.               Vitals  Vitals:    04/17/25 1019   BP: 132/60   BP Location: Left arm   Patient Position: Sitting   Cuff Size: Standard   Pulse: 60   SpO2: 98%   Weight: 71.2 kg (157 lb)   Height: 5' 6\" (1.676 m)       Physical Exam  Vitals reviewed.   Constitutional:       General: He is not in acute distress.     Appearance: Normal appearance. He is not ill-appearing.   HENT:      Head: Normocephalic and atraumatic.      Nose: Nose normal.   Eyes:      General: No scleral icterus.  Pulmonary:      Effort: No respiratory distress.   Abdominal:      General: Abdomen is flat. There is no distension.      Palpations: Abdomen is soft.      Tenderness: There is no abdominal tenderness.   Musculoskeletal:         General: Normal range of motion.      Cervical back: Normal range of motion.   Skin:     General: Skin is warm.      Coloration: Skin is not jaundiced.   Neurological:      Mental Status: He is alert and oriented to person, place, and time.      Gait: Gait normal.   Psychiatric:         Mood and Affect: Mood normal.         Behavior: Behavior normal.           Past History  Past Medical History:   Diagnosis Date    Elevated PSA     Erectile dysfunction after radical prostatectomy     Herpes zoster 11/1/2012    " Hypercholesteremia     Incontinence     Malignant neoplasm of prostate (HCC)      Social History     Socioeconomic History    Marital status: /Civil Union     Spouse name: None    Number of children: None    Years of education: None    Highest education level: None   Occupational History    None   Tobacco Use    Smoking status: Never    Smokeless tobacco: Never   Vaping Use    Vaping status: Never Used   Substance and Sexual Activity    Alcohol use: Yes    Drug use: No    Sexual activity: None   Other Topics Concern    None   Social History Narrative    None     Social Drivers of Health     Financial Resource Strain: Low Risk  (7/11/2024)    Received from [facility]    Overall Financial Resource Strain (CARDIA)     Difficulty of Paying Living Expenses: Not hard at all   Food Insecurity: Unknown (7/11/2024)    Received from [facility]    Hunger Vital Sign     Worried About Running Out of Food in the Last Year: Not on file     Ran Out of Food in the Last Year: Never true   Transportation Needs: No Transportation Needs (7/11/2024)    Received from [facility]    PRAPARE - Transportation     Lack of Transportation (Medical): No     Lack of Transportation (Non-Medical): No   Physical Activity: Not on file   Stress: No Stress Concern Present (7/11/2024)    Received from [facility]    [employer] - Occupational Stress Questionnaire     Feeling of Stress : Not at all   Social Connections: Feeling Socially Integrated (7/11/2024)    Received from [facility]    OASIS : Social Isolation     How often do you feel lonely or isolated from those around you?: Never   Intimate Partner Violence: Not At Risk (7/11/2024)    [facility], [facility]    Humiliation, Afraid, Rape, and Kick questionnaire     Fear of Current or Ex-Partner: No     Emotionally Abused:  "No     Physically Abused: No     Sexually Abused: No   Housing Stability: Unknown (7/11/2024)    Received from [facility]    Housing Stability Vital Sign     Unable to Pay for Housing in the Last Year: No     Number of Times Moved in the Last Year: Not on file     Homeless in the Last Year: No     Social History     Tobacco Use   Smoking Status Never   Smokeless Tobacco Never     Family History   Problem Relation Age of Onset    Breast cancer Mother     Heart disease Father        The following portions of the patient's history were reviewed and updated as appropriate: allergies, current medications, past medical history, past social history, past surgical history and problem list.    Results  No results found for this or any previous visit (from the past hour).  ]  No results found for: \"PSA\"  Lab Results   Component Value Date    CALCIUM 9.2 04/12/2025    K 5.1 04/12/2025    CO2 27 04/12/2025     04/12/2025    BUN 19 04/12/2025    CREATININE 1.14 04/12/2025     Lab Results   Component Value Date    HGB 13.2 02/21/2020    HCT 39.8 02/21/2020      "

## 2025-04-17 NOTE — ASSESSMENT & PLAN NOTE
Continues to utilize 1 pad per week for his minimal stress urinary incontinence  Patient currently content with his stress urinary incontinence.  If his symptoms were to worsen then I would recommend pelvic floor physical therapy.